# Patient Record
Sex: FEMALE | Race: BLACK OR AFRICAN AMERICAN | NOT HISPANIC OR LATINO | URBAN - METROPOLITAN AREA
[De-identification: names, ages, dates, MRNs, and addresses within clinical notes are randomized per-mention and may not be internally consistent; named-entity substitution may affect disease eponyms.]

---

## 2023-09-17 ENCOUNTER — HOSPITAL ENCOUNTER (OUTPATIENT)
Dept: DATA CONVERSION | Facility: HOSPITAL | Age: 63
Discharge: HOME | End: 2023-09-17

## 2023-09-17 DIAGNOSIS — F79 UNSPECIFIED INTELLECTUAL DISABILITIES: ICD-10-CM

## 2023-09-17 DIAGNOSIS — F20.9 SCHIZOPHRENIA, UNSPECIFIED (MULTI): ICD-10-CM

## 2023-09-17 DIAGNOSIS — M25.552 PAIN IN LEFT HIP: ICD-10-CM
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
## 2023-09-17 DIAGNOSIS — M25.559 PAIN IN UNSPECIFIED HIP: ICD-10-CM

## 2023-09-17 DIAGNOSIS — M25.551 PAIN IN RIGHT HIP: ICD-10-CM

## 2023-10-08 ENCOUNTER — HOSPITAL ENCOUNTER (EMERGENCY)
Facility: HOSPITAL | Age: 63
Discharge: HOME | End: 2023-10-09
Attending: STUDENT IN AN ORGANIZED HEALTH CARE EDUCATION/TRAINING PROGRAM
Payer: MEDICARE

## 2023-10-08 DIAGNOSIS — R53.83 OTHER FATIGUE: Primary | ICD-10-CM

## 2023-10-08 PROCEDURE — 93010 ELECTROCARDIOGRAM REPORT: CPT | Performed by: STUDENT IN AN ORGANIZED HEALTH CARE EDUCATION/TRAINING PROGRAM

## 2023-10-08 PROCEDURE — 99283 EMERGENCY DEPT VISIT LOW MDM: CPT | Mod: 25 | Performed by: STUDENT IN AN ORGANIZED HEALTH CARE EDUCATION/TRAINING PROGRAM

## 2023-10-08 PROCEDURE — 99285 EMERGENCY DEPT VISIT HI MDM: CPT | Performed by: STUDENT IN AN ORGANIZED HEALTH CARE EDUCATION/TRAINING PROGRAM

## 2023-10-08 RX ORDER — RISPERIDONE 2 MG/1
2 TABLET, ORALLY DISINTEGRATING ORAL 2 TIMES DAILY
COMMUNITY

## 2023-10-08 RX ORDER — ACETAMINOPHEN, DIPHENHYDRAMINE HCL, PHENYLEPHRINE HCL 325; 25; 5 MG/1; MG/1; MG/1
5 TABLET ORAL DAILY
COMMUNITY

## 2023-10-08 RX ORDER — DOXEPIN HYDROCHLORIDE 10 MG/1
50 CAPSULE ORAL NIGHTLY
COMMUNITY

## 2023-10-08 RX ORDER — DIPHENHYDRAMINE HCL 50 MG
50 CAPSULE ORAL NIGHTLY PRN
COMMUNITY

## 2023-10-08 ASSESSMENT — LIFESTYLE VARIABLES
HAVE YOU EVER FELT YOU SHOULD CUT DOWN ON YOUR DRINKING: NO
EVER FELT BAD OR GUILTY ABOUT YOUR DRINKING: NO
EVER HAD A DRINK FIRST THING IN THE MORNING TO STEADY YOUR NERVES TO GET RID OF A HANGOVER: NO
HAVE PEOPLE ANNOYED YOU BY CRITICIZING YOUR DRINKING: NO

## 2023-10-08 ASSESSMENT — COLUMBIA-SUICIDE SEVERITY RATING SCALE - C-SSRS
1. IN THE PAST MONTH, HAVE YOU WISHED YOU WERE DEAD OR WISHED YOU COULD GO TO SLEEP AND NOT WAKE UP?: NO
2. HAVE YOU ACTUALLY HAD ANY THOUGHTS OF KILLING YOURSELF?: NO
6. HAVE YOU EVER DONE ANYTHING, STARTED TO DO ANYTHING, OR PREPARED TO DO ANYTHING TO END YOUR LIFE?: NO

## 2023-10-08 ASSESSMENT — PAIN - FUNCTIONAL ASSESSMENT: PAIN_FUNCTIONAL_ASSESSMENT: 0-10

## 2023-10-08 ASSESSMENT — PAIN SCALES - GENERAL: PAINLEVEL_OUTOF10: 0 - NO PAIN

## 2023-10-09 ENCOUNTER — APPOINTMENT (OUTPATIENT)
Dept: CARDIOLOGY | Facility: HOSPITAL | Age: 63
End: 2023-10-09
Payer: MEDICARE

## 2023-10-09 ENCOUNTER — APPOINTMENT (OUTPATIENT)
Dept: RADIOLOGY | Facility: HOSPITAL | Age: 63
End: 2023-10-09
Payer: MEDICARE

## 2023-10-09 VITALS
DIASTOLIC BLOOD PRESSURE: 88 MMHG | HEART RATE: 77 BPM | BODY MASS INDEX: 20.46 KG/M2 | OXYGEN SATURATION: 100 % | WEIGHT: 135 LBS | TEMPERATURE: 98.6 F | SYSTOLIC BLOOD PRESSURE: 136 MMHG | RESPIRATION RATE: 14 BRPM | HEIGHT: 68 IN

## 2023-10-09 LAB
ALBUMIN SERPL BCP-MCNC: 4.4 G/DL (ref 3.4–5)
ALP SERPL-CCNC: 92 U/L (ref 33–136)
ALT SERPL W P-5'-P-CCNC: 16 U/L (ref 7–45)
ANION GAP SERPL CALC-SCNC: 14 MMOL/L (ref 10–20)
APPEARANCE UR: CLEAR
AST SERPL W P-5'-P-CCNC: 17 U/L (ref 9–39)
ATRIAL RATE: 88 BPM
BASOPHILS # BLD AUTO: 0.03 X10*3/UL (ref 0–0.1)
BASOPHILS NFR BLD AUTO: 0.4 %
BILIRUB SERPL-MCNC: 0.6 MG/DL (ref 0–1.2)
BILIRUB UR STRIP.AUTO-MCNC: NEGATIVE MG/DL
BUN SERPL-MCNC: 9 MG/DL (ref 6–23)
CALCIUM SERPL-MCNC: 10.3 MG/DL (ref 8.6–10.6)
CHLORIDE SERPL-SCNC: 107 MMOL/L (ref 98–107)
CO2 SERPL-SCNC: 24 MMOL/L (ref 21–32)
COLOR UR: YELLOW
CREAT SERPL-MCNC: 0.82 MG/DL (ref 0.5–1.05)
EOSINOPHIL # BLD AUTO: 0.21 X10*3/UL (ref 0–0.7)
EOSINOPHIL NFR BLD AUTO: 2.9 %
ERYTHROCYTE [DISTWIDTH] IN BLOOD BY AUTOMATED COUNT: 12.5 % (ref 11.5–14.5)
GFR SERPL CREATININE-BSD FRML MDRD: 81 ML/MIN/1.73M*2
GLUCOSE SERPL-MCNC: 118 MG/DL (ref 74–99)
GLUCOSE UR STRIP.AUTO-MCNC: NEGATIVE MG/DL
HCT VFR BLD AUTO: 47.8 % (ref 36–46)
HGB BLD-MCNC: 15.6 G/DL (ref 12–16)
IMM GRANULOCYTES # BLD AUTO: 0.02 X10*3/UL (ref 0–0.7)
IMM GRANULOCYTES NFR BLD AUTO: 0.3 % (ref 0–0.9)
KETONES UR STRIP.AUTO-MCNC: NEGATIVE MG/DL
LEUKOCYTE ESTERASE UR QL STRIP.AUTO: NEGATIVE
LYMPHOCYTES # BLD AUTO: 2.56 X10*3/UL (ref 1.2–4.8)
LYMPHOCYTES NFR BLD AUTO: 35 %
MCH RBC QN AUTO: 27.4 PG (ref 26–34)
MCHC RBC AUTO-ENTMCNC: 32.6 G/DL (ref 32–36)
MCV RBC AUTO: 84 FL (ref 80–100)
MONOCYTES # BLD AUTO: 0.43 X10*3/UL (ref 0.1–1)
MONOCYTES NFR BLD AUTO: 5.9 %
NEUTROPHILS # BLD AUTO: 4.06 X10*3/UL (ref 1.2–7.7)
NEUTROPHILS NFR BLD AUTO: 55.5 %
NITRITE UR QL STRIP.AUTO: NEGATIVE
NRBC BLD-RTO: 0 /100 WBCS (ref 0–0)
P AXIS: 74 DEGREES
P OFFSET: 209 MS
P ONSET: 163 MS
PH UR STRIP.AUTO: 5 [PH]
PLATELET # BLD AUTO: 255 X10*3/UL (ref 150–450)
PMV BLD AUTO: 10 FL (ref 7.5–11.5)
POTASSIUM SERPL-SCNC: 4.3 MMOL/L (ref 3.5–5.3)
PR INTERVAL: 124 MS
PROT SERPL-MCNC: 7.8 G/DL (ref 6.4–8.2)
PROT UR STRIP.AUTO-MCNC: NEGATIVE MG/DL
Q ONSET: 225 MS
QRS COUNT: 15 BEATS
QRS DURATION: 68 MS
QT INTERVAL: 362 MS
QTC CALCULATION(BAZETT): 438 MS
QTC FREDERICIA: 411 MS
R AXIS: 59 DEGREES
RBC # BLD AUTO: 5.7 X10*6/UL (ref 4–5.2)
RBC # UR STRIP.AUTO: NEGATIVE /UL
SODIUM SERPL-SCNC: 141 MMOL/L (ref 136–145)
SP GR UR STRIP.AUTO: 1.01
T AXIS: 60 DEGREES
T OFFSET: 406 MS
TSH SERPL-ACNC: 1.14 MIU/L (ref 0.44–3.98)
UROBILINOGEN UR STRIP.AUTO-MCNC: 2 MG/DL
VENTRICULAR RATE: 88 BPM
WBC # BLD AUTO: 7.3 X10*3/UL (ref 4.4–11.3)

## 2023-10-09 PROCEDURE — 85025 COMPLETE CBC W/AUTO DIFF WBC: CPT

## 2023-10-09 PROCEDURE — 87075 CULTR BACTERIA EXCEPT BLOOD: CPT | Mod: 59

## 2023-10-09 PROCEDURE — 84443 ASSAY THYROID STIM HORMONE: CPT

## 2023-10-09 PROCEDURE — 99205 OFFICE O/P NEW HI 60 MIN: CPT

## 2023-10-09 PROCEDURE — 71046 X-RAY EXAM CHEST 2 VIEWS: CPT

## 2023-10-09 PROCEDURE — 71046 X-RAY EXAM CHEST 2 VIEWS: CPT | Performed by: RADIOLOGY

## 2023-10-09 PROCEDURE — 36415 COLL VENOUS BLD VENIPUNCTURE: CPT

## 2023-10-09 PROCEDURE — 93005 ELECTROCARDIOGRAM TRACING: CPT

## 2023-10-09 PROCEDURE — 80053 COMPREHEN METABOLIC PANEL: CPT

## 2023-10-09 PROCEDURE — 81003 URINALYSIS AUTO W/O SCOPE: CPT

## 2023-10-09 RX ORDER — LORAZEPAM 2 MG/ML
2 INJECTION INTRAMUSCULAR ONCE
Status: DISCONTINUED | OUTPATIENT
Start: 2023-10-09 | End: 2023-10-09

## 2023-10-09 ASSESSMENT — PAIN - FUNCTIONAL ASSESSMENT: PAIN_FUNCTIONAL_ASSESSMENT: 0-10

## 2023-10-09 ASSESSMENT — PAIN SCALES - GENERAL
PAINLEVEL_OUTOF10: 0 - NO PAIN
PAINLEVEL_OUTOF10: 0 - NO PAIN

## 2023-10-09 NOTE — ED PROVIDER NOTES
HPI   Chief Complaint   Patient presents with    Fatigue       Patient is a 62-year-old female with a PMH of paranoid schizophrenia and chronic insomnia who was brought in by her mother (POA & Legal guardian) for multiple medical complaints.  Guardian reports that the patient has not slept since Thursday.  She also reports that the patient has had inability to sit still and is have had shaking like movements of upper extremities.  Cardiology reports that the patient has been saying incomprehensible sounds/words.  She reports that the patient was recently put on Benadryl and doxepin since her psychiatric facility discharged on September 20.  Guardian denies that the patient has been complaining of any pain, cough, or urinary symptoms.                          No data recorded                Patient History   History reviewed. No pertinent past medical history.  History reviewed. No pertinent surgical history.  No family history on file.  Social History     Tobacco Use    Smoking status: Never    Smokeless tobacco: Never   Vaping Use    Vaping Use: Never used   Substance Use Topics    Alcohol use: Not on file    Drug use: Never       Physical Exam   ED Triage Vitals [10/08/23 2309]   Temp Heart Rate Resp BP   38 °C (100.4 °F) 92 18 133/86      SpO2 Temp Source Heart Rate Source Patient Position   94 % Tympanic -- Sitting      BP Location FiO2 (%)     Right arm --       Physical Exam  Constitutional:       General: She is awake.      Appearance: Normal appearance. She is not ill-appearing or toxic-appearing.   HENT:      Head: Normocephalic and atraumatic.      Mouth/Throat:      Lips: Pink.      Mouth: Mucous membranes are moist.   Eyes:      Extraocular Movements: Extraocular movements intact.      Conjunctiva/sclera: Conjunctivae normal.      Pupils: Pupils are equal, round, and reactive to light.   Cardiovascular:      Rate and Rhythm: Normal rate and regular rhythm.      Pulses: Normal pulses.           Radial  pulses are 2+ on the right side and 2+ on the left side.        Dorsalis pedis pulses are 2+ on the right side and 2+ on the left side.      Heart sounds: Normal heart sounds.      No friction rub. No gallop.   Pulmonary:      Effort: Pulmonary effort is normal. Tachypnea present. No bradypnea.      Breath sounds: Normal breath sounds.   Abdominal:      General: There is no distension.      Palpations: Abdomen is soft.      Tenderness: There is no abdominal tenderness. There is no right CVA tenderness, left CVA tenderness, guarding or rebound.   Musculoskeletal:      Right lower leg: No edema.      Left lower leg: No edema.   Skin:     General: Skin is warm and dry.      Capillary Refill: Capillary refill takes 2 to 3 seconds.   Neurological:      General: No focal deficit present.      Mental Status: She is alert.      Cranial Nerves: Cranial nerves 2-12 are intact.      Sensory: Sensation is intact.      Motor: Motor function is intact.      Coordination: Coordination is intact.      Comments: Pt has bilateral upper extremity tremors.   Psychiatric:         Attention and Perception: Attention normal.         Mood and Affect: Mood is anxious.         Behavior: Behavior is cooperative.         ED Course & MDM   ED Course as of 10/10/23 1902   Mon Oct 09, 2023   0852 EPAT consulted, will come to evaluate the patient here in the emergency department [RS]   1118 EPAT here to evaluate the patient, awaiting further recommendations [RS]      ED Course User Index  [RS] Billy Mcneal MD         Diagnoses as of 10/10/23 1902   Other fatigue       Medical Decision Making  Patient is a 62-year-old female with above PMH who presents to the ED for CC of multiple complaints.  Upon arrival patient's vital signs remarkable for tympanic temperature 100.4, she is in no acute distress, and appears nontoxic-appearing.  Repeat oral temperature remarkable for 98.4.  Upon examination patient is alert and oriented, patient has bilateral  upper extremity tremors that are abated by redirection or touch, abdomen soft nontender nondistended, lungs are clear to auscultation bilaterally.  Patient's presentation is most likely infectious in nature, psychiatric, or a medication/drug interaction. UA without signs of UTI.  CBC WNL.  CMP without acute process.  TSH normal.  CXR without PNA or PNX.  EKG is nonischemic and not arrhythmic.  Infectious etiology is less likely today and I suspect patient's initial temperature was some multiple layering of clothing.  Patient has no suicidal or homicidal ideations.  Patient does have chronic insomnia and paranoid schizophrenia.  Her symptoms today may be likely due to medication or interaction with a combination of psychiatric issues.  Therefore medical psychiatry will be consulted in the a.m.  Patient was signed out in stable condition to oncoming ED team.    EKG interpretation:   Normal sinus rhythm. Rate of 88 bpm. Normal axis. Normal intervals. No acute ST elevations, depressions, or T wave inversions. Unchanged when compared to previous EKG completed on August 30, 2023.        Procedure  Procedures     Vikash Easton DO  Resident  10/10/23 1913

## 2023-10-09 NOTE — PROGRESS NOTES
I received Candy Hernandez in signout from Dr. Vikash Easton.  Please see the previous note for all HPI, PE and MDM up to the time of signout at 0700.    In brief Candy Hernandez is an 62 y.o. female presenting for   Chief Complaint   Patient presents with    Fatigue     Patient has a past medical history of paranoid schizophrenia and chronic insomnia and presents to the emergency department with fatigue and a new symptom onset of bilateral upper extremity tremors.  Additionally, the patient has been having episodes of moaning and thrashing in the bed since starting a TCA on 9/20/2023 after being admitted to her psychiatric facility.  The patient did have a mildly elevated temperature upon arrival of 100.4 °F that resolved spontaneously after removing multiple layers of clothing.  Infectious work-up has been negative.    At the time of signout we were awaiting: Psychiatry evaluation and final disposition    EPAT came to the emergency department to evaluate the patient and recommended no current changes to the patient's medications.  The patient had an original intake performed at the [facility] on 10/2 and are currently awaiting scheduling for outpatient follow-up.  Tiffanie with a EPAT called the facility and discussed the patient's case with them and they are open until 4:00pm today and was told that the patient should come to the facility and can get outpatient follow-up scheduled today.  They can further discuss medication changes there as their team will be following the patient as an outpatient.  This plan was discussed with the daughter and she was in agreement with this.  The patient and her daughter were provided with return precautions and they verbalized understanding of this and were discharged in stable condition.    Assessment and plan discussed with Dr. Annamarie Sage    Pt Disposition: Discharge    Billy Mcneal MD   Emergency Medicine, PGY-1     Procedures

## 2023-10-09 NOTE — CONSULTS
"Referring Provider  Annamarie Sage    History Of Present Illness  Candy Hernandez is a 62 y.o. female with PPHx of schizophrenia & IDD & a PMHx of primary polydipsia & chronic insomnia presenting to [facility] ED with her mother (legal guardian) on 10/8/23 with c/c of  multiple intermittent symptoms (fatigue, insomnia, upper extremity tremors, moaning & thrashing).      Past Medical History  Per HPI    Surgical History  She has no past surgical history on file.     Social History  She reports that she has never smoked. She has never used smokeless tobacco. She reports that she does not use drugs. No history on file for alcohol use. Lives at home with mother (Amy) & her sister.      Allergies  Patient has no known allergies.    Review of Systems    Psychiatric ROS - Adult  Anxiety: Negative  Depression: psychomotor retardation and sleep decreased   Delirium: negative  Psychosis: negative  Sugey: negative  Safety Issues: none    Recent hospitalizations: most recent [facility] 8/30-9/20/23  Current mental health agency: had intake at [facility] on 10/2/23     Current psychiatric medications: doxepin 10 mg HS, benadryl 50 mg HS, risperdal 2 mg HS, melatonin 10 mg HS    Recent medication changes: during most recent hospitalization at [facility] last month, pt was started on doxepin & taken off of remeron & atarax      Mental Status Exam  General: 61 y/o AA female, reclined in ED cot wearing personal attire, in NAD  Appearance: appears stated age  Attitude: calm, cooperative  Behavior: appropriate eye contact  Motor Activity: slight PMR (slowed speech, delayed response at times), gait not assessed. No PMA. No overt symptoms of TD/EPS.   Speech: low volume & tone, mumbled, non-spontaneous  Mood: \"good\"  Affect: flat  Thought Process: concrete  Thought Content: denies SI/HI. Denies delusions  Thought Perception: denies AVH. Does not appear to be responding to hallucinatory stimuli.   Cognition: alert, oriented to self & year. Poor " "historian.  Insight: impaired  Judgement: chronically impaired, requires legal guardian    Psychiatric Risk Assessment  Violence Risk Assessment: lower IQ, major mental illness, and unemployment  Acute Risk of Harm to Others is Considered: low   Suicide Risk Assessment: current psychiatric illness and unmarried  Protective Factors against Suicide: adherence to  treatment, positive family relationships, and social support/connectedness  Acute Risk of Harm to Self is Considered: low    Last Recorded Vitals  Blood pressure 136/88, pulse 77, temperature 37 °C (98.6 °F), resp. rate 14, height 1.727 m (5' 8\"), weight 61.2 kg (135 lb), SpO2 100 %.    Relevant Results  Scheduled medications    Continuous medications    PRN medications    Results for orders placed or performed during the hospital encounter of 10/08/23 (from the past 24 hour(s))   Urinalysis with Reflex Microscopic   Result Value Ref Range    Color, Urine Yellow Straw, Yellow    Appearance, Urine Clear Clear    Specific Gravity, Urine 1.014 1.005 - 1.035    pH, Urine 5.0 5.0, 5.5, 6.0, 6.5, 7.0, 7.5, 8.0    Protein, Urine NEGATIVE NEGATIVE mg/dL    Glucose, Urine NEGATIVE NEGATIVE mg/dL    Blood, Urine NEGATIVE NEGATIVE    Ketones, Urine NEGATIVE NEGATIVE mg/dL    Bilirubin, Urine NEGATIVE NEGATIVE    Urobilinogen, Urine 2.0 (N) <2.0 mg/dL    Nitrite, Urine NEGATIVE NEGATIVE    Leukocyte Esterase, Urine NEGATIVE NEGATIVE   ECG 12 lead   Result Value Ref Range    Ventricular Rate 88 BPM    Atrial Rate 88 BPM    VA Interval 124 ms    QRS Duration 68 ms    QT Interval 362 ms    QTC Calculation(Bazett) 438 ms    P Axis 74 degrees    R Axis 59 degrees    T Axis 60 degrees    QRS Count 15 beats    Q Onset 225 ms    P Onset 163 ms    P Offset 209 ms    T Offset 406 ms    QTC Fredericia 411 ms   CBC and Auto Differential   Result Value Ref Range    WBC 7.3 4.4 - 11.3 x10*3/uL    nRBC 0.0 0.0 - 0.0 /100 WBCs    RBC 5.70 (H) 4.00 - 5.20 x10*6/uL    Hemoglobin 15.6 12.0 " "- 16.0 g/dL    Hematocrit 47.8 (H) 36.0 - 46.0 %    MCV 84 80 - 100 fL    MCH 27.4 26.0 - 34.0 pg    MCHC 32.6 32.0 - 36.0 g/dL    RDW 12.5 11.5 - 14.5 %    Platelets 255 150 - 450 x10*3/uL    MPV 10.0 7.5 - 11.5 fL    Neutrophils % 55.5 40.0 - 80.0 %    Immature Granulocytes %, Automated 0.3 0.0 - 0.9 %    Lymphocytes % 35.0 13.0 - 44.0 %    Monocytes % 5.9 2.0 - 10.0 %    Eosinophils % 2.9 0.0 - 6.0 %    Basophils % 0.4 0.0 - 2.0 %    Neutrophils Absolute 4.06 1.20 - 7.70 x10*3/uL    Immature Granulocytes Absolute, Automated 0.02 0.00 - 0.70 x10*3/uL    Lymphocytes Absolute 2.56 1.20 - 4.80 x10*3/uL    Monocytes Absolute 0.43 0.10 - 1.00 x10*3/uL    Eosinophils Absolute 0.21 0.00 - 0.70 x10*3/uL    Basophils Absolute 0.03 0.00 - 0.10 x10*3/uL   Comprehensive metabolic panel   Result Value Ref Range    Glucose 118 (H) 74 - 99 mg/dL    Sodium 141 136 - 145 mmol/L    Potassium 4.3 3.5 - 5.3 mmol/L    Chloride 107 98 - 107 mmol/L    Bicarbonate 24 21 - 32 mmol/L    Anion Gap 14 10 - 20 mmol/L    Urea Nitrogen 9 6 - 23 mg/dL    Creatinine 0.82 0.50 - 1.05 mg/dL    eGFR 81 >60 mL/min/1.73m*2    Calcium 10.3 8.6 - 10.6 mg/dL    Albumin 4.4 3.4 - 5.0 g/dL    Alkaline Phosphatase 92 33 - 136 U/L    Total Protein 7.8 6.4 - 8.2 g/dL    AST 17 9 - 39 U/L    Bilirubin, Total 0.6 0.0 - 1.2 mg/dL    ALT 16 7 - 45 U/L   Blood Culture    Specimen: Peripheral Venipuncture; Blood culture   Result Value Ref Range    Blood Culture Loaded on Instrument - Culture in progress    TSH with reflex to Free T4 if abnormal   Result Value Ref Range    Thyroid Stimulating Hormone 1.14 0.44 - 3.98 mIU/L       Assessment/Plan     On assessment today, the pt is reclined in bed and her mother is at bedside. The pt makes eye contact with  upon entering her room & smiles She says she is \"good\" but is slow to respond, has a flat affect and often looks to her mother to answer questions for her. Majority of information is obtained from pt's " mother Amy, who is pt's legal guardian. Amy shares that the pt was discharged from [facility] on 9/20/23 and during this hospitalization was taken off of remeron & atarax & started on doxepin for insomnia. The pt had an intake appointment at the [facility] on 10/2/23 & is awaiting a follow-up call to schedule primary psychiatry & . Per Amy, since discharge on 9/20, the pt has had waxing & waning symptoms concerning her sleep, not completing ADLs in sequence (ex: drying hands before rinsing off soap), labile mood (flat to crying when overwhelmed) & tremors. Per ED staff, the pt has been calm, cooperative, sleeping intermittently since arrival with no appreciation for tremors. Pt has multiple presentations following medication changes for symptoms described as 'restlessness, insomnia, moaning' similarly to today's visit.     Amy had called Dr. Dillon (at [facility]) regarding concern for the above symptoms & she was instructed to take the pt to either psychiatric urgent care at the [facility] or the nearest ED. Amy brought the pt to the ED as she was unsure urgent care hours given today is a holiday. Amy is hopeful for medication adjustment, does not believe that the pt is decompensated and requiring a subsequent inpatient admission. Amy has no concerns for violence and the pt is described as being compliant with daily medication administration.     I called the [facility] & spoke to staff in the behavioral health urgent care. Staff reports the clinic is open with psychiatric providers for walk-in availability today until 1600. When asked about update on pt's appointments pending, staff informed me that appointments can be scheduled in person from the clinic. I discussed this with the pt and her guardian present.     For continuity of care & management of medications, it would be of pt's best interest to have all of her psychiatric care at one facility rather than through the ED, as  the pt will be followed as an outpatient with the [facility]. She previously was followed through [facility], but due to providers moving, most recently her PCP has been managing her psychiatric medications.    Pt and her mother will be discharged from the ED and plan to go down the street to [facility] today.     Impression  Schizophrenia, chronic  IDD      Recommendations  Following a chart review, safety risk assessment, interview with pt & pt's guardian and ED staff, I do not feel that this pt meets involuntary inpatient psychiatric hospitalization at this time. I am not recommending any medication management changes.     I discussed these recommendations with the ED provider, Dr. Mcneal, who was in agreement with above plan of care.        I spent 75 minutes in the professional and overall care of this patient.      Medication Consent  Medication Consent: n/a; consult service    RODOLFO Gaspar-CNP

## 2023-10-09 NOTE — ED TRIAGE NOTES
Pt arrived to ED with her mom, she is the legal guardian. She states that her daughter has been off. She also states that she started a new medication (banophen and doxepin) recently and she also been having frequent urination

## 2023-10-13 LAB — BACTERIA BLD CULT: NORMAL

## 2023-10-26 LAB — HOLD SPECIMEN: NORMAL

## 2024-05-07 ENCOUNTER — APPOINTMENT (OUTPATIENT)
Dept: RADIOLOGY | Facility: HOSPITAL | Age: 64
End: 2024-05-07
Payer: MEDICARE

## 2024-05-07 ENCOUNTER — CLINICAL SUPPORT (OUTPATIENT)
Dept: EMERGENCY MEDICINE | Facility: HOSPITAL | Age: 64
End: 2024-05-07
Payer: MEDICARE

## 2024-05-07 ENCOUNTER — HOSPITAL ENCOUNTER (EMERGENCY)
Facility: HOSPITAL | Age: 64
Discharge: OTHER INSTITUTION | End: 2024-05-09
Attending: EMERGENCY MEDICINE
Payer: MEDICARE

## 2024-05-07 DIAGNOSIS — F20.1 DISORGANIZED SCHIZOPHRENIA (MULTI): Primary | ICD-10-CM

## 2024-05-07 LAB
ALBUMIN SERPL BCP-MCNC: 4.3 G/DL (ref 3.4–5)
ALP SERPL-CCNC: 84 U/L (ref 33–136)
ALT SERPL W P-5'-P-CCNC: 11 U/L (ref 7–45)
AMPHETAMINES UR QL SCN: ABNORMAL
ANION GAP SERPL CALC-SCNC: 17 MMOL/L (ref 10–20)
APAP SERPL-MCNC: <10 UG/ML
APPEARANCE UR: CLEAR
AST SERPL W P-5'-P-CCNC: 19 U/L (ref 9–39)
BARBITURATES UR QL SCN: ABNORMAL
BASOPHILS # BLD AUTO: 0.04 X10*3/UL (ref 0–0.1)
BASOPHILS NFR BLD AUTO: 0.5 %
BENZODIAZ UR QL SCN: ABNORMAL
BILIRUB SERPL-MCNC: 0.8 MG/DL (ref 0–1.2)
BILIRUB UR STRIP.AUTO-MCNC: NEGATIVE MG/DL
BUN SERPL-MCNC: 12 MG/DL (ref 6–23)
BZE UR QL SCN: ABNORMAL
CALCIUM SERPL-MCNC: 9.4 MG/DL (ref 8.6–10.6)
CANNABINOIDS UR QL SCN: ABNORMAL
CHLORIDE SERPL-SCNC: 109 MMOL/L (ref 98–107)
CO2 SERPL-SCNC: 20 MMOL/L (ref 21–32)
COLOR UR: YELLOW
CREAT SERPL-MCNC: 0.86 MG/DL (ref 0.5–1.05)
EGFRCR SERPLBLD CKD-EPI 2021: 76 ML/MIN/1.73M*2
EOSINOPHIL # BLD AUTO: 0.08 X10*3/UL (ref 0–0.7)
EOSINOPHIL NFR BLD AUTO: 0.9 %
ERYTHROCYTE [DISTWIDTH] IN BLOOD BY AUTOMATED COUNT: 13.3 % (ref 11.5–14.5)
ETHANOL SERPL-MCNC: <10 MG/DL
FENTANYL+NORFENTANYL UR QL SCN: ABNORMAL
GLUCOSE SERPL-MCNC: 111 MG/DL (ref 74–99)
GLUCOSE UR STRIP.AUTO-MCNC: ABNORMAL MG/DL
HCT VFR BLD AUTO: 42.8 % (ref 36–46)
HGB BLD-MCNC: 14.6 G/DL (ref 12–16)
IMM GRANULOCYTES # BLD AUTO: 0.04 X10*3/UL (ref 0–0.7)
IMM GRANULOCYTES NFR BLD AUTO: 0.5 % (ref 0–0.9)
KETONES UR STRIP.AUTO-MCNC: ABNORMAL MG/DL
LEUKOCYTE ESTERASE UR QL STRIP.AUTO: NEGATIVE
LYMPHOCYTES # BLD AUTO: 2.22 X10*3/UL (ref 1.2–4.8)
LYMPHOCYTES NFR BLD AUTO: 25 %
MCH RBC QN AUTO: 27.4 PG (ref 26–34)
MCHC RBC AUTO-ENTMCNC: 34.1 G/DL (ref 32–36)
MCV RBC AUTO: 80 FL (ref 80–100)
METHADONE UR QL SCN: ABNORMAL
MONOCYTES # BLD AUTO: 0.71 X10*3/UL (ref 0.1–1)
MONOCYTES NFR BLD AUTO: 8 %
MUCOUS THREADS #/AREA URNS AUTO: NORMAL /LPF
NEUTROPHILS # BLD AUTO: 5.78 X10*3/UL (ref 1.2–7.7)
NEUTROPHILS NFR BLD AUTO: 65.1 %
NITRITE UR QL STRIP.AUTO: NEGATIVE
NRBC BLD-RTO: 0 /100 WBCS (ref 0–0)
OPIATES UR QL SCN: ABNORMAL
OXYCODONE+OXYMORPHONE UR QL SCN: ABNORMAL
PCP UR QL SCN: ABNORMAL
PH UR STRIP.AUTO: 5.5 [PH]
PLATELET # BLD AUTO: 214 X10*3/UL (ref 150–450)
POTASSIUM SERPL-SCNC: 3.8 MMOL/L (ref 3.5–5.3)
PROT SERPL-MCNC: 6.9 G/DL (ref 6.4–8.2)
PROT UR STRIP.AUTO-MCNC: ABNORMAL MG/DL
RBC # BLD AUTO: 5.33 X10*6/UL (ref 4–5.2)
RBC # UR STRIP.AUTO: ABNORMAL /UL
RBC #/AREA URNS AUTO: NORMAL /HPF
SALICYLATES SERPL-MCNC: <3 MG/DL
SODIUM SERPL-SCNC: 142 MMOL/L (ref 136–145)
SP GR UR STRIP.AUTO: 1.03
SQUAMOUS #/AREA URNS AUTO: NORMAL /HPF
UROBILINOGEN UR STRIP.AUTO-MCNC: ABNORMAL MG/DL
WBC # BLD AUTO: 8.9 X10*3/UL (ref 4.4–11.3)
WBC #/AREA URNS AUTO: NORMAL /HPF

## 2024-05-07 PROCEDURE — 96372 THER/PROPH/DIAG INJ SC/IM: CPT

## 2024-05-07 PROCEDURE — 84075 ASSAY ALKALINE PHOSPHATASE: CPT | Performed by: EMERGENCY MEDICINE

## 2024-05-07 PROCEDURE — 70450 CT HEAD/BRAIN W/O DYE: CPT

## 2024-05-07 PROCEDURE — 70450 CT HEAD/BRAIN W/O DYE: CPT | Performed by: RADIOLOGY

## 2024-05-07 PROCEDURE — 80143 DRUG ASSAY ACETAMINOPHEN: CPT | Performed by: EMERGENCY MEDICINE

## 2024-05-07 PROCEDURE — 36415 COLL VENOUS BLD VENIPUNCTURE: CPT | Performed by: EMERGENCY MEDICINE

## 2024-05-07 PROCEDURE — 99285 EMERGENCY DEPT VISIT HI MDM: CPT | Mod: 25

## 2024-05-07 PROCEDURE — 80320 DRUG SCREEN QUANTALCOHOLS: CPT | Performed by: EMERGENCY MEDICINE

## 2024-05-07 PROCEDURE — 93005 ELECTROCARDIOGRAM TRACING: CPT

## 2024-05-07 PROCEDURE — 85025 COMPLETE CBC W/AUTO DIFF WBC: CPT | Performed by: EMERGENCY MEDICINE

## 2024-05-07 PROCEDURE — 81001 URINALYSIS AUTO W/SCOPE: CPT | Mod: 59 | Performed by: EMERGENCY MEDICINE

## 2024-05-07 PROCEDURE — 2500000004 HC RX 250 GENERAL PHARMACY W/ HCPCS (ALT 636 FOR OP/ED): Mod: SE

## 2024-05-07 PROCEDURE — 80307 DRUG TEST PRSMV CHEM ANLYZR: CPT | Performed by: EMERGENCY MEDICINE

## 2024-05-07 PROCEDURE — 99285 EMERGENCY DEPT VISIT HI MDM: CPT | Performed by: EMERGENCY MEDICINE

## 2024-05-07 RX ORDER — MIDAZOLAM HYDROCHLORIDE 5 MG/ML
5 INJECTION, SOLUTION INTRAMUSCULAR; INTRAVENOUS ONCE
Status: COMPLETED | OUTPATIENT
Start: 2024-05-07 | End: 2024-05-07

## 2024-05-07 RX ORDER — OLANZAPINE 10 MG/2ML
INJECTION, POWDER, FOR SOLUTION INTRAMUSCULAR
Status: COMPLETED
Start: 2024-05-07 | End: 2024-05-07

## 2024-05-07 RX ORDER — OLANZAPINE 10 MG/2ML
5 INJECTION, POWDER, FOR SOLUTION INTRAMUSCULAR ONCE
Status: COMPLETED | OUTPATIENT
Start: 2024-05-07 | End: 2024-05-07

## 2024-05-07 RX ORDER — MIDAZOLAM HYDROCHLORIDE 5 MG/ML
INJECTION, SOLUTION INTRAMUSCULAR; INTRAVENOUS
Status: COMPLETED
Start: 2024-05-07 | End: 2024-05-07

## 2024-05-07 RX ADMIN — OLANZAPINE 5 MG: 10 INJECTION, POWDER, FOR SOLUTION INTRAMUSCULAR at 14:43

## 2024-05-07 RX ADMIN — MIDAZOLAM HYDROCHLORIDE 5 MG: 5 INJECTION, SOLUTION INTRAMUSCULAR; INTRAVENOUS at 14:43

## 2024-05-07 ASSESSMENT — COLUMBIA-SUICIDE SEVERITY RATING SCALE - C-SSRS
2. HAVE YOU ACTUALLY HAD ANY THOUGHTS OF KILLING YOURSELF?: NO
1. IN THE PAST MONTH, HAVE YOU WISHED YOU WERE DEAD OR WISHED YOU COULD GO TO SLEEP AND NOT WAKE UP?: NO
6. HAVE YOU EVER DONE ANYTHING, STARTED TO DO ANYTHING, OR PREPARED TO DO ANYTHING TO END YOUR LIFE?: NO

## 2024-05-07 ASSESSMENT — PAIN - FUNCTIONAL ASSESSMENT: PAIN_FUNCTIONAL_ASSESSMENT: 0-10

## 2024-05-07 ASSESSMENT — PAIN SCALES - GENERAL: PAINLEVEL_OUTOF10: 0 - NO PAIN

## 2024-05-07 NOTE — PROGRESS NOTES
Emergency Medicine Transition of Care Note.    I assumed care of Candy Hernandez at signout.  Please see the previous ED provider note for all HPI, PE and MDM prior to my arrival. This is in addition to the primary record.    In brief Candy Hernandez is an 63 y.o. female presenting for   Chief Complaint   Patient presents with    Psychiatric Evaluation     At the time of signout we were awaiting: EPAT recommendations and disposition    Under my care, patient was medically cleared.      Procedure  Procedures    Conner Torres, DO

## 2024-05-07 NOTE — ED TRIAGE NOTES
Pt to ED with c/o psyh evaluation. Per family she states the patient has been shaking and been very internally stimulated, seeing people that are not there and not making sense. Pt family states she points at things that are not there and whispers under her breath. Family states this has been ongoing for about 3 days. Pt denies SI/HI.

## 2024-05-07 NOTE — PROGRESS NOTES
Behavioral Restraint / Seclusion Face to Face Assessment    Patient Name:         Candy Hernandez  YOB: 1960  Medical Record #:   [id number]      Time Restraints were placed:      Date Assessment was completed: 5/7/2024    Time patient was assessed:  1445     Description of behavior causing restraint/seclusion:  Unable to redirect and perform a safe evaluation to the patient    Type of intervention: Physical restraint (holding)    Patient's immediate situation: no signs of physical distress    Alternatives Attempted: Alternatives attempted and have been ineffective.    Contraindications for Restraints: Reviewed contraindications for continued restraint use and agree to on-going need.    Patent's reaction to intervention: appears safe, appears comfortable, and appears to be tolerating restraint/seclusion without distress or adverse response    Patient's medical condition: vital signs stable, normal circulation and breathing, positioned safely based upon medical and psychological issues, skin is protected, and hydration and nutrition are addressed    Patient's behavioral condition: Other bizzare    Plan: Discontinue restraints when patient meets criteria

## 2024-05-07 NOTE — ED PROVIDER NOTES
HPI   Chief Complaint   Patient presents with    Psychiatric Evaluation       HPI  History taking technically challenging patient is altered, bizarre unable to contribute to her history taking, no surrogate historian was immediately available and history was only obtained on interview with nursing and chart review   Records reveal patient has history of anxiety depression schizophrenia, has had multiple ED visits with bizarre delusions and emotional lability most recently documented in April 26, 2024 noted at [facility].  Patient appears very labile here going between giggling during the physical exam to crying out loudly, repeating the spelling of her name over and over with a random assortment of numbers but able to follow my commands.  According to nursing the patient presented with her daughter who stated that she was having delusions.  The patient's presentation as I appreciate here seem to match multiple additional emergency department evaluations documented in the past.    I have no other history to complete a review of systems               Robinson Coma Scale Score: 15                     Patient History   No past medical history on file.  No past surgical history on file.  No family history on file.  Social History     Tobacco Use    Smoking status: Never    Smokeless tobacco: Never   Vaping Use    Vaping status: Never Used   Substance Use Topics    Alcohol use: Not on file    Drug use: Never       Physical Exam   ED Triage Vitals [05/07/24 1354]   Temperature Heart Rate Respirations BP   37.7 °C (99.8 °F) (!) 119 16 (!) 180/113      Pulse Ox Temp Source Heart Rate Source Patient Position   96 % Temporal Monitor Sitting      BP Location FiO2 (%)     Right arm --       Physical Exam  Vitals and nursing note reviewed.   Constitutional:       General: She is not in acute distress.     Appearance: Normal appearance.   HENT:      Head: Normocephalic.      Right Ear: External ear normal.      Left Ear:  External ear normal.      Nose: Nose normal.      Mouth/Throat:      Mouth: Mucous membranes are moist.      Pharynx: Oropharynx is clear.   Eyes:      Extraocular Movements: Extraocular movements intact.   Cardiovascular:      Rate and Rhythm: Tachycardia present.      Pulses: Normal pulses.   Pulmonary:      Effort: Pulmonary effort is normal.      Breath sounds: Normal breath sounds.   Abdominal:      General: Abdomen is flat. There is no distension.      Palpations: Abdomen is soft.   Musculoskeletal:         General: Normal range of motion.      Cervical back: Normal range of motion.   Skin:     General: Skin is warm and dry.   Neurological:      Mental Status: She is alert.      Comments: Symmetric 5-5 strength with , shoulder function hip flexion and extension knee flexion extension dorsi and plantarflexion ambulatory with a stable gait, face is symmetric without droop sensation appears grossly intact   Psychiatric:      Comments: Patient bizarre with emotional lability going from crying to giggling while I evaluate her, spells her name repeatedly whenever asked a question however speaks with clear phonation enunciating each letter clearly and spelling her name correctly, further able to follow my commands during the physical examination.  No haritha aphasia was noted, not disheveled         ED Course & MDM        Medical Decision Making  -Patient is bizarre, there is no surrogate historian major history was obtained via record review, the patient does appear to have documented emergency department visits that seem to mirror the presentation I am appreciating currently with the emotional lability.  -There is no sign of any external trauma on the patient, reassuringly she is able to follow my commands despite perseveration on spelling her name repeatedly demonstrate symmetric  strength, shoulder strength, hip flexion knee flexion and extension.  I have witnessed her ambulate in the room with symmetric  gait which is reassuring lowering my suspicion for any intracranial abnormality.  Nonetheless given the patient's labile behavior we will CT her to evaluate for potential frontal lesion causing bulbar symptoms.  Given the multiple documented visits in the past this also may be manifested the patient's baseline schizophrenia with emotional lability  -Patient was unable to cooperate with the examination, in the interest of her and staff safety we administered 5 mg of Zyprexa 5 mg of Versed she will be placed in restraints and a face-to-face was completed please see separate documentation for this.    ADDITIONAL HX: Mom  Mom states that the patient has had a gradual escalation of what sounds like a relatively recurrent presentation of a typical psychotic episode for her, including the spelling of her name and various words, typical onset.  Mom confirms that the patient has not had any recent falls or injury that she knows of has not been sick recently no fevers or chills no nausea vomiting or diarrhea.    1600  At this time the lab evaluation CT head as well as full medical evaluation pending, the patient will be signed out to the receiving ED team ultimately she will need psychiatric services following medical clearance and her disposition is pending             Papi Ortiz, DO  05/07/24 1538

## 2024-05-08 LAB
ATRIAL RATE: 115 BPM
HOLD SPECIMEN: NORMAL
P AXIS: 78 DEGREES
P OFFSET: 212 MS
P ONSET: 168 MS
PR INTERVAL: 112 MS
Q ONSET: 224 MS
QRS COUNT: 19 BEATS
QRS DURATION: 80 MS
QT INTERVAL: 308 MS
QTC CALCULATION(BAZETT): 426 MS
QTC FREDERICIA: 382 MS
R AXIS: 57 DEGREES
T AXIS: 59 DEGREES
T OFFSET: 378 MS
VENTRICULAR RATE: 115 BPM

## 2024-05-08 PROCEDURE — 2500000001 HC RX 250 WO HCPCS SELF ADMINISTERED DRUGS (ALT 637 FOR MEDICARE OP): Mod: SE

## 2024-05-08 PROCEDURE — 2500000006 HC RX 250 W HCPCS SELF ADMINISTERED DRUGS (ALT 637 FOR ALL PAYERS): Mod: SE,MUE | Performed by: STUDENT IN AN ORGANIZED HEALTH CARE EDUCATION/TRAINING PROGRAM

## 2024-05-08 PROCEDURE — 2500000001 HC RX 250 WO HCPCS SELF ADMINISTERED DRUGS (ALT 637 FOR MEDICARE OP): Mod: SE | Performed by: STUDENT IN AN ORGANIZED HEALTH CARE EDUCATION/TRAINING PROGRAM

## 2024-05-08 RX ORDER — ARIPIPRAZOLE 2 MG/1
2 TABLET ORAL 2 TIMES DAILY
Status: DISCONTINUED | OUTPATIENT
Start: 2024-05-08 | End: 2024-05-09 | Stop reason: HOSPADM

## 2024-05-08 RX ORDER — NAPROXEN 500 MG/1
500 TABLET ORAL ONCE
Status: COMPLETED | OUTPATIENT
Start: 2024-05-08 | End: 2024-05-08

## 2024-05-08 RX ORDER — QUETIAPINE FUMARATE 100 MG/1
100 TABLET, FILM COATED ORAL NIGHTLY
Status: DISCONTINUED | OUTPATIENT
Start: 2024-05-08 | End: 2024-05-09 | Stop reason: HOSPADM

## 2024-05-08 RX ORDER — HYDROXYZINE HYDROCHLORIDE 25 MG/1
1 TABLET, FILM COATED ORAL 2 TIMES DAILY PRN
COMMUNITY
Start: 2024-04-05

## 2024-05-08 RX ORDER — BENZTROPINE MESYLATE 1 MG/1
1 TABLET ORAL 2 TIMES DAILY
COMMUNITY
Start: 2023-10-17

## 2024-05-08 RX ORDER — HYDROXYZINE HYDROCHLORIDE 25 MG/1
25 TABLET, FILM COATED ORAL 2 TIMES DAILY PRN
Status: DISCONTINUED | OUTPATIENT
Start: 2024-05-08 | End: 2024-05-09 | Stop reason: HOSPADM

## 2024-05-08 RX ORDER — BENZTROPINE MESYLATE 1 MG/1
1 TABLET ORAL 2 TIMES DAILY
Status: DISCONTINUED | OUTPATIENT
Start: 2024-05-08 | End: 2024-05-09 | Stop reason: HOSPADM

## 2024-05-08 RX ORDER — NAPROXEN 500 MG/1
TABLET ORAL
Status: COMPLETED
Start: 2024-05-08 | End: 2024-05-08

## 2024-05-08 RX ORDER — QUETIAPINE FUMARATE 100 MG/1
100 TABLET, FILM COATED ORAL
COMMUNITY
Start: 2024-05-01 | End: 2024-07-30

## 2024-05-08 RX ORDER — ARIPIPRAZOLE 2 MG/1
2 TABLET ORAL 2 TIMES DAILY
COMMUNITY
Start: 2023-10-17

## 2024-05-08 RX ADMIN — ARIPIPRAZOLE 2 MG: 2 TABLET ORAL at 08:13

## 2024-05-08 RX ADMIN — NAPROXEN 500 MG: 500 TABLET ORAL at 03:06

## 2024-05-08 RX ADMIN — BENZTROPINE MESYLATE 1 MG: 1 TABLET ORAL at 22:11

## 2024-05-08 RX ADMIN — ARIPIPRAZOLE 2 MG: 2 TABLET ORAL at 22:13

## 2024-05-08 RX ADMIN — BENZTROPINE MESYLATE 1 MG: 1 TABLET ORAL at 08:13

## 2024-05-08 RX ADMIN — BENZTROPINE MESYLATE 1 MG: 1 TABLET ORAL at 03:03

## 2024-05-08 RX ADMIN — HYDROXYZINE HYDROCHLORIDE 25 MG: 25 TABLET, FILM COATED ORAL at 03:04

## 2024-05-08 RX ADMIN — QUETIAPINE FUMARATE 100 MG: 100 TABLET ORAL at 22:11

## 2024-05-08 RX ADMIN — QUETIAPINE FUMARATE 100 MG: 100 TABLET ORAL at 03:03

## 2024-05-08 SDOH — HEALTH STABILITY: MENTAL HEALTH: ARE YOU HAVING THOUGHTS OF KILLING YOURSELF RIGHT NOW?: NO

## 2024-05-08 SDOH — ECONOMIC STABILITY: HOUSING INSECURITY: FEELS SAFE LIVING IN HOME: YES

## 2024-05-08 SDOH — HEALTH STABILITY: MENTAL HEALTH: ANXIETY SYMPTOMS: GENERALIZED

## 2024-05-08 SDOH — HEALTH STABILITY: MENTAL HEALTH: IN THE PAST FEW WEEKS, HAVE YOU FELT THAT YOU OR YOUR FAMILY WOULD BE BETTER OFF IF YOU WERE DEAD?: NO

## 2024-05-08 SDOH — HEALTH STABILITY: MENTAL HEALTH: NON-SPECIFIC ACTIVE SUICIDAL THOUGHTS (PAST 1 MONTH): NO

## 2024-05-08 SDOH — HEALTH STABILITY: MENTAL HEALTH: HAVE YOU EVER TRIED TO KILL YOURSELF?: NO

## 2024-05-08 SDOH — HEALTH STABILITY: MENTAL HEALTH: IN THE PAST WEEK, HAVE YOU BEEN HAVING THOUGHTS ABOUT KILLING YOURSELF?: NO

## 2024-05-08 SDOH — HEALTH STABILITY: MENTAL HEALTH: IN THE PAST FEW WEEKS, HAVE YOU WISHED YOU WERE DEAD?: NO

## 2024-05-08 SDOH — HEALTH STABILITY: MENTAL HEALTH: WISH TO BE DEAD (PAST 1 MONTH): NO

## 2024-05-08 SDOH — HEALTH STABILITY: MENTAL HEALTH: SUICIDAL BEHAVIOR (LIFETIME): NO

## 2024-05-08 SDOH — HEALTH STABILITY: MENTAL HEALTH: DEPRESSION SYMPTOMS: CRYING

## 2024-05-08 ASSESSMENT — PAIN SCALES - GENERAL
PAINLEVEL_OUTOF10: 4

## 2024-05-08 ASSESSMENT — PAIN DESCRIPTION - LOCATION: LOCATION: LEG

## 2024-05-08 ASSESSMENT — COLUMBIA-SUICIDE SEVERITY RATING SCALE - C-SSRS
6. HAVE YOU EVER DONE ANYTHING, STARTED TO DO ANYTHING, OR PREPARED TO DO ANYTHING TO END YOUR LIFE?: NO
2. HAVE YOU ACTUALLY HAD ANY THOUGHTS OF KILLING YOURSELF?: NO
1. SINCE LAST CONTACT, HAVE YOU WISHED YOU WERE DEAD OR WISHED YOU COULD GO TO SLEEP AND NOT WAKE UP?: NO

## 2024-05-08 ASSESSMENT — LIFESTYLE VARIABLES
SUBSTANCE_ABUSE_PAST_12_MONTHS: NO
PRESCIPTION_ABUSE_PAST_12_MONTHS: NO

## 2024-05-08 ASSESSMENT — PAIN - FUNCTIONAL ASSESSMENT: PAIN_FUNCTIONAL_ASSESSMENT: 0-10

## 2024-05-08 ASSESSMENT — PAIN DESCRIPTION - ORIENTATION: ORIENTATION: RIGHT;LEFT

## 2024-05-08 NOTE — PROGRESS NOTES
"EPAT - Social Work Psychiatric Assessment    Arrival Details  Mode of Arrival: Ambulatory  Admission Source: Home  Admission Type: Involuntary  EPAT Assessment Start Date: 05/08/24  EPAT Assessment Start Time: 0120  Name of : Julieth Bradshaw [facility]    History of Present Illness    Admission Reason: Evaluation    HPI:     63 year old Black female with history of Schizoaffective Disorder and mild intellectual disability brought to emergency department by her mother with concern for decompensation in the context of medication changes.  The patient had been on Risperdal but developed EPS and was switched to 100mg of Seroquel and per mother symptoms have now worsened.  The patient is not sleeping.  She is paranoid and talking about seeing snakes.  She is not eating well.  Provider Note and chart history is reviewed.  The patient has history of multiple admissions but had been relatively stable until this recent medication change.  She had similar pattern of multiple ED visits in 2019 after a medication change.  In assessment, she is a poor historian and either completely whispering or spelling out her responses in the air.  She frequently wants to whisper her responses in the interviewer's ear but is not making much sense.  She frequently points out in the hallway and appears fearful someone is trying to harm her.  Initially she was described as laughing one minute and crying the next by ED staff.  She did receive Zyprexa and Versed several hours ago.  Her recent visits to [facility] and [facility] ED suggest that she is decompensated from these visits as she is currently unable to participate in any meaningful way due to psychosis.  She shakes her head \"no\" when asked about HI and SI.  She is able to follow commands.         Psychiatric Symptoms  Anxiety Symptoms: Generalized  Depression Symptoms: Crying  Sugey Symptoms: Less need to sleep    Psychosis Symptoms  Hallucination Type: Auditory  Delusion Type: " Paranoid    Additional Symptoms - Adult  Generalized Anxiety Disorder: Difficult to control worry  Obsessive Compulsive Disorder: No problems reported or observed.  Panic Attack: No problems reported or observed.  Post Traumatic Stress Disorder: Traumatic event  Delirium: No problems reported or observed.  Review of Symptoms Comments: see summary    Past Psychiatric History/Meds/Treatments  Past Psychiatric History: Multiple prior admissions [facility] in 9/2023.  History at [facility], [place], and [facility]  Past Psychiatric Meds/Treatments: Currently on Seroquel 100mg  Past Violence/Victimization History: none    Current Mental Health Contacts   Name/Phone Number: n/a   Last Appointment Date: n/a  Provider Name/Phone Number: [facility]  Provider Last Appointment Date: Eneida REYNOLDS CNP    Support System: Immediate family    Living Arrangement: Lives with someone    Home Safety  Feels Safe Living in Home: Yes    Income Information  Employment Status for: Patient  Employment Status: Disabled  Income Source: Disability    Meta Service/Education History  Current or Previous  Service: None  Education Level: High school  History of Learning Problems: Yes    Social/Cultural History  Social History: Born and raised locally. Living with mother and sister.  Has one son who is currently incarcerated.  High School Diploma in specialized classes due to developmental disability.  Important Activities: Family    Legal  Legal Comments: none    Drug Screening  Have you used any substances (canabis, cocaine, heroin, hallucinogens, inhalants, etc.) in the past 12 months?: No  Have you used any prescription drugs other than prescribed in the past 12 months?: No  Is a toxicology screen needed?: Yes              Orientation  Orientation Level: Disoriented to time, Disoriented to situation    General Appearance  Motor Activity: Gestures  Speech Pattern: Excessively soft  General Attitude:  Attentive  Appearance/Hygiene: Unremarkable    Thought Process  Coherency: [place] thinking, Disorganized  Content: Unable to assess  Delusions: Paranoid  Perception: Hallucinations  Hallucination: Auditory  Judgment/Insight: Impaired  Confusion: Unable to assess  Cognition: Cognitive delay, Poor attention/concentration    Sleep Pattern  Sleep Pattern: Insomnia    Risk Factors  Self Harm/Suicidal Ideation Plan: denies  Previous Self Harm/Suicidal Plans: unable to assess  Risk Factors: Lower IQ, Major mental illness  Description of Thoughts/Ideas Leaving Unit Now: denies    Violence Risk Assessment  Assessment of Violence: None noted  Thoughts of Harm to Others: No    Ability to Assess Risk Screen  Risk Screen - Ability to Assess: Able to be screened  Ask Suicide-Screening Questions  1. In the past few weeks, have you wished you were dead?: No  2. In the past few weeks, have you felt that you or your family would be better off if you were dead?: No  3. In the past week, have you been having thoughts about killing yourself?: No  4. Have you ever tried to kill yourself?: No  5. Are you having thoughts of killing yourself right now?: No  Calculated Risk Score: No intervention is necessary  [place] Suicide Severity Rating Scale (Screener/Recent Self-Report)  1. Wish to be Dead (Past 1 Month): No  2. Non-Specific Active Suicidal Thoughts (Past 1 Month): No  6. Suicidal Behavior (Lifetime): No  Calculated C-SSRS Risk Score (Lifetime/Recent): No Risk Indicated  Step 1: Risk Factors  Current & Past Psychiatric Dx: Mood disorder, Psychotic disorder  Presenting Symptoms: Insomia, Psychosis  Precipitants/Stressors:  (n/a)  Change in Treatment:  (n/a)  Access to Lethal Methods : No  Step 2: Protective Factors   Protective Factors Internal: Fear of death or the actual act of killing self  Protective Factors External: Supportive social network or family or friends  Step 3: Suicidal Ideation Intensity  Most Severe Suicidal  "Ideation Identified: n/a  How Many Times Have You Had These Thoughts: Less than once a week  When You Have the Thoughts How Long do They Last : Fleeting - few seconds or minutes  Could/Can You Stop Thinking About Killing Yourself or Wanting to Die if You Want to: Does not attempt to control thoughts  Are There Things - Anyone or Anything - That Stopped You From Wanting to Die or Acting on: Does not apply  What Sort of Reasons Did You Have For Thinking About Wanting to Die or Killing Yourself: Does not apply  Total Score: 2  Step 5: Documentation  Risk Level: Low suicide risk    Psychiatric Impression and Plan of Care    Assessment and Plan:     Mother of the patient brings her into the ED today reporting decompensation including paranoia, delusional thought content, hallucinations, insomnia and poor appetite.  The patient has a history of Schizoaffective Disorder and Mild Intellectual Disability.  She is higher functioning when stable and can attend day programming and work.  The patient had a recent medication change in late January due to EPS and was taken off of Risperdal and started on Seroquel.  Since that time she had been speaking with her  father and her son who is halfway and not present at home. She has reported seeing snakes and has been afraid.  She was noted to be somewhat labile upon arrival to the ED laughing one moment and then crying.  She was treated with versed.  She is pleasant in interview but speaking with her hand over her mouth, whispering and pointing out the door constantly.  She whispers in the interviewer's ear but is not making much sense.  She makes odd zig zag motions with her hands and may be trying to describe a snake.  Despite significant disorganization she does follow commands.  She shakes her head \"No\" regarding SI and HI.  The patient was recently seen at [facility] 2 weeks ago and by her provider at [facility] on .  During these evaluations she was verbal and able to " participate although noted to be tangential on 5/1.  Her Seroquel dosage had been increased but her mother reports she has not benefited from the increased dosage.  She has a history of numerous prior admissions.  She was last inpatient at [facility] in September 2023.  Currently the patient is decompensated from her known baseline and is unable to function due to her mental illness.  She is recommended for inpatient psychiatric admission for stabilization.    Schizoaffective Disorder  Intellectual Disability, Mild           Specific Resources Provided to Patient: n/a  CM Notified: no  PHP/IOP Recommended: n/a  Specific Information Provided for PHP/IOP: n/a  Plan Comments: see summary    Outcome/Disposition  Patient's Perception of Outcome Achieved: oblivious  Assessment, Recommendations and Risk Level Reviewed with: Dr. Howard  Contact Name: Jimmy Mary  Contact Number(s): [phone]  Contact Relationship: mother  EPAT Assessment Completed Date: 05/08/24  EPAT Assessment Completed Time: 0210  Patient Disposition: Out of network facility (Specify)  Out of Network Reason:  unit at capability capacity

## 2024-05-08 NOTE — SIGNIFICANT EVENT
Application for Emergency Admission      Ready for Transfer?  Is the patient medically cleared for transfer to inpatient psychiatry: Yes  Has the patient been accepted to an inpatient psychiatric hospital: Yes    Application for Emergency Admission  IN ACCORDANCE [place]  The Chief Clinical Officer of: [place] 5/8/2024 .4:09 PM    Reason for Hospitalization  The undersigned has reason to believe that: Candy Hernandez Is a mentally ill person subject to hospitalization by court order under division B Section 5122.01 of the Revised Code, i.e., this person:    1.Yes  Represents a substantial risk of physical harm to self as manifested by evidence of threats of, or attempts at, suicide or serious self-inflicted bodily harm    2.Yes Represents a substantial risk of physical harm to others as manifested by evidence of recent homicidal or other violent behavior, evidence of recent threats that place another in reasonable fear of violent behavior and serious physical harm, or other evidence of present dangerousness    3.Yes Represents a substantial and immediate risk of serious physical impairment or injury to self as manifested by  evidence that the person is unable to provide for and is not providing for the person's basic physical needs because of the person's mental illness and that appropriate provision for those needs cannot be made  immediately available in the community    4.Yes Would benefit from treatment in a hospital for his mental illness and is in need of such treatment as manifested by evidence of behavior that creates a grave and imminent risk to substantial rights of others or  himself.    5.Yes Would benefit from treatment as manifested by evidence of behavior that indicates all of the following:       (a) The person is unlikely to survive safely in the community without supervision, based on a clinical determination.       (b) The person has a history of lack of compliance  with treatment for mental illness and one of the following applies:      (i) At least twice within the thirty-six months prior to the filing of an affidavit seeking court-ordered treatment of the person under section 5122.111 of the Revised Code, the lack of compliance has been a significant factor in necessitating hospitalization in a hospital or receipt of services in a forensic or other mental health unit of a correctional facility, provided that the thirty-six-month period shall be extended by the length of any hospitalization or incarceration of the person that occurred within the thirty-six-month period.      (ii) Within the forty-eight months prior to the filing of an affidavit seeking court-ordered treatment of the person under section 5122.111 of the Revised Code, the lack of compliance resulted in one or more acts of serious violent behavior toward self or others or threats of, or attempts at, serious physical harm to self or others, provided that the forty-eight-month period shall be extended by the length of any hospitalization or incarceration of the person that occurred within the forty-eight-month period.      (c) The person, as a result of mental illness, is unlikely to voluntarily participate in necessary treatment.       (d) In view of the person's treatment history and current behavior, the person is in need of treatment in order to prevent a relapse or deterioration that would be likely to result in substantial risk of serious harm to the person or others.    (e) Represents a substantial risk of physical harm to self or others if allowed to remain at liberty pending examination.    Therefore, it is requested that said person be admitted to the above named facility.    STATEMENT OF BELIEF    Must be filled out by one of the following: a psychiatrist, licensed physician, licensed clinical psychologist, health or ,  or .  (Statement shall include the circumstances  under which the individual was taken into custody and the reason for the person's belief that hospitalization is necessary. The statement shall also include a reference to efforts made to secure the individual's property at his residence if he was taken into custody there. Every reasonable and appropriate effort should be made to take this person into custody in the least conspicuous manner possible.)    Patient continues to express delusions uncontrolled with outpatient management and will benefit from inpatient psychiatric management.     Tato Hill MD 5/8/2024     _____________________________________________________________   Place of Employment: [facility]    STATEMENT OF OBSERVATION BY PSYCHIATRIST, LICENSED PHYSICIAN, OR LICENSED CLINICAL PSYCHOLOGIST, IF APPLICABLE    Place of Observation (e.g., [facility] mental [facility] center, general hospital, office, emergency facility)  (If applicable, please complete)    Tato Hill MD 5/8/2024    _____________________________________________________________

## 2024-05-08 NOTE — PROGRESS NOTES
Patient was handed off to me by Dr. Torres at 2300. For full history, physical, and prior ED course, please see previous provider note prior to patient handoff. This is an addendum to the record.     HOSPITAL COURSE/MEDICAL DECISION MAKING  In short, this is a 63-year-old female presenting to the emergency department for psychiatric evaluation.  Has a history of schizophrenia and unfortunately does appear to be decompensated.  Signed out to me pending EPAT recommendations, however per the previous team's initial impression she did seem like she needed to be placed for her decompensated schizophrenia. Throughout the ED stay, the patient was monitored and re-examined for any changes in stability or symptomatology.     ED Course as of 05/07/24 2348   Tue May 07, 2024   1705 CT head wo IV contrast  I independently interpreted:  No acute intracranial pathology [ONEL]   1722 Medically cleared [ONEL]   1722 ECG 12 lead  I independently interpreted:   Onset rate 115 bpm, sinus rhythm, normal axis.  Normal intervals.  T wave inversions in leads aVR which are normal.  No appreciable ST elevations or depressions.  Evidence of LVH with deep S waves in anterior precordial leads, large R wave in the lateral precordial leads.  Impression: Sinus tachycardia with evidence of LVH [ONEL]      ED Course User Index  [ONEL] Conner Torres, DO         Diagnoses as of 05/07/24 2348   Disorganized schizophrenia (Multi)       DIAGNOSIS  1. Psychosis    DISPOSITION  Signed out to incoming provider at 0700; please see their note for full details regarding disposition.     I reviewed the patient´s case with Dr. Jaffe who also saw the patient and agrees with the plan. The diagnosis and plan of care was also discussed with the patient. All the patient's questions were answered. The patient was receptive and agreeable to the plan of care.     Uvaldo Styles MD  Emergency Medicine PGY-3    This note was dictated using dragon dictation.  Please  excuse any errors found in the note.

## 2024-05-09 VITALS
DIASTOLIC BLOOD PRESSURE: 87 MMHG | SYSTOLIC BLOOD PRESSURE: 136 MMHG | HEIGHT: 68 IN | HEART RATE: 88 BPM | BODY MASS INDEX: 21.22 KG/M2 | OXYGEN SATURATION: 98 % | TEMPERATURE: 98.8 F | RESPIRATION RATE: 16 BRPM | WEIGHT: 140 LBS

## 2024-05-09 PROCEDURE — 2500000001 HC RX 250 WO HCPCS SELF ADMINISTERED DRUGS (ALT 637 FOR MEDICARE OP): Mod: SE | Performed by: STUDENT IN AN ORGANIZED HEALTH CARE EDUCATION/TRAINING PROGRAM

## 2024-05-09 RX ADMIN — BENZTROPINE MESYLATE 1 MG: 1 TABLET ORAL at 09:00

## 2024-05-09 RX ADMIN — ARIPIPRAZOLE 2 MG: 2 TABLET ORAL at 09:00
